# Patient Record
Sex: MALE | Race: OTHER | HISPANIC OR LATINO | URBAN - METROPOLITAN AREA
[De-identification: names, ages, dates, MRNs, and addresses within clinical notes are randomized per-mention and may not be internally consistent; named-entity substitution may affect disease eponyms.]

---

## 2017-10-22 ENCOUNTER — OUTPATIENT (OUTPATIENT)
Dept: OUTPATIENT SERVICES | Facility: HOSPITAL | Age: 27
LOS: 1 days | End: 2017-10-22
Payer: COMMERCIAL

## 2017-10-22 PROCEDURE — 74160 CT ABDOMEN W/CONTRAST: CPT | Mod: 26

## 2017-10-22 PROCEDURE — 74160 CT ABDOMEN W/CONTRAST: CPT

## 2019-07-02 ENCOUNTER — APPOINTMENT (OUTPATIENT)
Dept: ORTHOPEDIC SURGERY | Facility: CLINIC | Age: 29
End: 2019-07-02
Payer: COMMERCIAL

## 2019-07-02 VITALS — HEIGHT: 68 IN | WEIGHT: 150 LBS | BODY MASS INDEX: 22.73 KG/M2

## 2019-07-02 DIAGNOSIS — M23.91 UNSPECIFIED INTERNAL DERANGEMENT OF RIGHT KNEE: ICD-10-CM

## 2019-07-02 PROBLEM — Z00.00 ENCOUNTER FOR PREVENTIVE HEALTH EXAMINATION: Status: ACTIVE | Noted: 2019-07-02

## 2019-07-02 PROCEDURE — 73562 X-RAY EXAM OF KNEE 3: CPT | Mod: RT

## 2019-07-02 PROCEDURE — 99204 OFFICE O/P NEW MOD 45 MIN: CPT

## 2019-07-02 NOTE — PHYSICAL EXAM
[de-identified] : Right knee shows no warmth or swelling. There is tenderness along the medial joint line. There is a negative instability testing. There is a positive Fran test. Neurovascular exam is normal [de-identified] : Right knee x-ray shows no evidence of fracture or dislocation.

## 2019-07-02 NOTE — HISTORY OF PRESENT ILLNESS
[de-identified] : Location: right knee\par Quality: dull pain level 8/10\par Duration:6/2/2019\par Context: atraumatic\par Aggravating Factors: walking,bending,stairs\par Conservative treatment: rest,brace\par Associated Symptoms: swelling, weakness\par Prior Studies:n.a\par

## 2019-07-02 NOTE — DISCUSSION/SUMMARY
[de-identified] : This patient has symptoms for one month I recommended getting an MRI. Followup will be done after the MRI for further recommendations.

## 2019-09-12 ENCOUNTER — APPOINTMENT (OUTPATIENT)
Dept: UROLOGY | Facility: CLINIC | Age: 29
End: 2019-09-12
Payer: COMMERCIAL

## 2019-09-12 VITALS — HEART RATE: 52 BPM | DIASTOLIC BLOOD PRESSURE: 77 MMHG | SYSTOLIC BLOOD PRESSURE: 117 MMHG | TEMPERATURE: 97.6 F

## 2019-09-12 PROCEDURE — 99204 OFFICE O/P NEW MOD 45 MIN: CPT

## 2019-09-12 NOTE — HISTORY OF PRESENT ILLNESS
[FreeTextEntry1] : 29M generally healthy comes in with complaint of right testicular pain. sent for scrotal sonogram. no records available for review. pain was noted in August. 8/10 pain initially which self resovled. pain lasted for 3 days. no dysuria. no hematurai. no urethral discharge. no nausea vomting. no history kidney stones. no fevers chills. no family history porstate kidney bladder cancer.

## 2019-09-12 NOTE — ASSESSMENT
[FreeTextEntry1] : right testis pain\par self resolved\par benign exam\par to send in sono results for review\par f/u PRN

## 2019-09-12 NOTE — PHYSICAL EXAM
[General Appearance - Well Developed] : well developed [General Appearance - Well Nourished] : well nourished [Normal Appearance] : normal appearance [Well Groomed] : well groomed [Heart Rate And Rhythm] : Heart rate and rhythm were normal [] : no respiratory distress [Abdomen Soft] : soft [Abdomen Tenderness] : non-tender [Abdomen Hernia] : no hernia was discovered [Costovertebral Angle Tenderness] : no ~M costovertebral angle tenderness [Urethral Meatus] : meatus normal [Penis Abnormality] : normal uncircumcised penis [Urinary Bladder Findings] : the bladder was normal on palpation [Epididymis] : the epididymides were normal [Scrotum] : the scrotum was normal [Testes Mass (___cm)] : there were no testicular masses [Testes Tenderness] : no tenderness of the testes [Normal Station and Gait] : the gait and station were normal for the patient's age [Skin Color & Pigmentation] : normal skin color and pigmentation [No Focal Deficits] : no focal deficits [Oriented To Time, Place, And Person] : oriented to person, place, and time [No Palpable Adenopathy] : no palpable adenopathy

## 2020-02-25 ENCOUNTER — APPOINTMENT (OUTPATIENT)
Dept: UROLOGY | Facility: CLINIC | Age: 30
End: 2020-02-25
Payer: COMMERCIAL

## 2020-02-25 VITALS — HEART RATE: 63 BPM | TEMPERATURE: 98.2 F | SYSTOLIC BLOOD PRESSURE: 110 MMHG | DIASTOLIC BLOOD PRESSURE: 64 MMHG

## 2020-02-25 DIAGNOSIS — N50.819 TESTICULAR PAIN, UNSPECIFIED: ICD-10-CM

## 2020-02-25 PROCEDURE — 99213 OFFICE O/P EST LOW 20 MIN: CPT

## 2020-02-25 NOTE — ASSESSMENT
[FreeTextEntry1] : bilateral testicular pain\par UA UCX \par scrotal ultrasound\par sitz baths\par f/u 6 weeks

## 2020-02-25 NOTE — HISTORY OF PRESENT ILLNESS
[FreeTextEntry1] : returns for follow up with repeat episode right testicular pain x 2-3 weeks. pain switches sides from left to right\par previous sono with ?smoldering orchitis per report\par 7/10 pain per his report - "its not that bad"\par not sharp pain\par feels well\par no hematuira\par no dysuria\par no additional compalitns.

## 2020-02-26 ENCOUNTER — FORM ENCOUNTER (OUTPATIENT)
Age: 30
End: 2020-02-26

## 2020-02-26 LAB
APPEARANCE: CLEAR
BACTERIA: NEGATIVE
BILIRUBIN URINE: NEGATIVE
BLOOD URINE: NEGATIVE
COLOR: YELLOW
GLUCOSE QUALITATIVE U: NEGATIVE
HYALINE CASTS: 1 /LPF
KETONES URINE: NEGATIVE
LEUKOCYTE ESTERASE URINE: NEGATIVE
MICROSCOPIC-UA: NORMAL
NITRITE URINE: NEGATIVE
PH URINE: 6
PROTEIN URINE: NEGATIVE
RED BLOOD CELLS URINE: 0 /HPF
SPECIFIC GRAVITY URINE: 1.03
SQUAMOUS EPITHELIAL CELLS: 0 /HPF
UROBILINOGEN URINE: NORMAL
WHITE BLOOD CELLS URINE: 0 /HPF

## 2020-02-27 ENCOUNTER — APPOINTMENT (OUTPATIENT)
Dept: ULTRASOUND IMAGING | Facility: HOSPITAL | Age: 30
End: 2020-02-27
Payer: COMMERCIAL

## 2020-02-27 ENCOUNTER — OUTPATIENT (OUTPATIENT)
Dept: OUTPATIENT SERVICES | Facility: HOSPITAL | Age: 30
LOS: 1 days | End: 2020-02-27
Payer: COMMERCIAL

## 2020-02-27 LAB — BACTERIA UR CULT: NORMAL

## 2020-02-27 PROCEDURE — 76870 US EXAM SCROTUM: CPT | Mod: 26

## 2020-02-27 PROCEDURE — 93975 VASCULAR STUDY: CPT

## 2020-02-27 PROCEDURE — 93975 VASCULAR STUDY: CPT | Mod: 26

## 2020-02-27 PROCEDURE — 76870 US EXAM SCROTUM: CPT

## 2020-04-17 ENCOUNTER — APPOINTMENT (OUTPATIENT)
Dept: UROLOGY | Facility: CLINIC | Age: 30
End: 2020-04-17

## 2020-07-10 ENCOUNTER — APPOINTMENT (OUTPATIENT)
Dept: UROLOGY | Facility: CLINIC | Age: 30
End: 2020-07-10